# Patient Record
Sex: FEMALE | Race: BLACK OR AFRICAN AMERICAN | Employment: UNEMPLOYED | URBAN - METROPOLITAN AREA
[De-identification: names, ages, dates, MRNs, and addresses within clinical notes are randomized per-mention and may not be internally consistent; named-entity substitution may affect disease eponyms.]

---

## 2017-02-20 ENCOUNTER — OFFICE VISIT (OUTPATIENT)
Dept: ENDOCRINOLOGY | Age: 27
End: 2017-02-20

## 2017-02-20 VITALS
WEIGHT: 152.6 LBS | HEIGHT: 66 IN | BODY MASS INDEX: 24.53 KG/M2 | TEMPERATURE: 97.9 F | SYSTOLIC BLOOD PRESSURE: 131 MMHG | DIASTOLIC BLOOD PRESSURE: 67 MMHG | RESPIRATION RATE: 16 BRPM | HEART RATE: 83 BPM

## 2017-02-20 DIAGNOSIS — E06.9 THYROIDITIS: Primary | ICD-10-CM

## 2017-02-20 DIAGNOSIS — E06.1 THYROIDITIS, DE QUERVAIN: Primary | ICD-10-CM

## 2017-02-20 RX ORDER — VENLAFAXINE HYDROCHLORIDE 75 MG/1
CAPSULE, EXTENDED RELEASE ORAL
Refills: 3 | COMMUNITY
Start: 2017-01-31 | End: 2017-06-15 | Stop reason: SDUPTHER

## 2017-02-20 RX ORDER — ZOLPIDEM TARTRATE 10 MG/1
TABLET ORAL
Refills: 0 | COMMUNITY
Start: 2017-01-31 | End: 2020-05-29

## 2017-02-20 RX ORDER — FAMOTIDINE 20 MG/1
20 TABLET, FILM COATED ORAL
Qty: 30 TAB | Refills: 5 | Status: SHIPPED | OUTPATIENT
Start: 2017-02-20 | End: 2017-03-28 | Stop reason: ALTCHOICE

## 2017-02-20 RX ORDER — PREDNISONE 10 MG/1
30 TABLET ORAL
Qty: 21 TAB | Refills: 0 | Status: SHIPPED | OUTPATIENT
Start: 2017-02-20 | End: 2017-02-27

## 2017-02-20 RX ORDER — IBUPROFEN 400 MG/1
400 TABLET ORAL 3 TIMES DAILY
Qty: 30 TAB | Refills: 0 | Status: SHIPPED | OUTPATIENT
Start: 2017-02-20 | End: 2017-03-02

## 2017-02-20 RX ORDER — AMOXICILLIN 500 MG/1
CAPSULE ORAL
Refills: 0 | COMMUNITY
Start: 2017-02-15 | End: 2017-03-28 | Stop reason: ALTCHOICE

## 2017-02-20 NOTE — PROGRESS NOTES
[facility] ENDOCRINOLOGY               Anabel Gonzalez MD              [address] [address]           Patient Information  Date:2/21/2017  Name : Refugio Blackwood 32 y.o.     YOB: 1990         Referred by: Beronica Madrid NP         CC : Thyroid    History of present illness    Refugio Blackwood is a 32 y.o. female  here for evaluation of hyperthyroidism. she was found to have abnormal thyroid function tests,  TSH was 0.2     She is nine months post partum. A week ago she noticed  sore throat, had difficulty swallowing and felt like throat was closing. She went to the ER. Strep was negative . She also had sweating and occasional nervousness. No racing of the heart. She denies iodine exposure, viral infection. She is not lactating and her TSH was 0. 2.      weight loss. Bowels are regular. No history of known radiation exposure    No FH of thyroid disease. No FH of thyroid cancer     Past Medical History   Diagnosis Date    Anxiety     Depression     Migraines        Current Outpatient Prescriptions   Medication Sig    venlafaxine-SR (EFFEXOR-XR) 75 mg capsule TK ONE C PO  ONCE DAILY    zolpidem (AMBIEN) 10 mg tablet TK 1 T PO  QHS PRN    amoxicillin (AMOXIL) 500 mg capsule TK ONE C PO  TID    predniSONE (DELTASONE) 10 mg tablet Take 3 Tabs by mouth daily (with breakfast) for 7 days.  famotidine (PEPCID) 20 mg tablet Take 1 Tab by mouth every morning.  ibuprofen (MOTRIN) 400 mg tablet Take 1 Tab by mouth three (3) times daily for 10 days. No current facility-administered medications for this visit.           Review of Systems:  - Constitutional Symptoms: no fevers, chills,  - Eyes: no blurry vision or double vision  - Cardiovascular: no chest pain ,palpitations  - Respiratory: no cough + shortness of breath  - Gastrointestinal: no dysphagia or abdominal pain  - Musculoskeletal: no joint pains + weakness  - Integumentary: no rashes  - Neurological: no numbness, tingling, +headaches  - Psychiatric: no depression or anxiety  - Endocrine:+ heat intolerance, no polyuria or polydipsia    Physical Examination:  Blood pressure 131/67, pulse 83, temperature 97.9 °F (36.6 °C), temperature source Oral, resp. rate 16, height 5' 6\" (1.676 m), weight 152 lb 9.6 oz (69.2 kg), last menstrual period 02/18/2017. Body mass index is 24.63 kg/(m^2). - General: pleasant, no distress, good eye contact  - HEENT: no exopthalmos, no periorbital edema, no scleral/conjunctival injection, EOMI, no lid lag or stare  - Neck: supple, no thyromegaly, tender, no bruit   - Cardiovascular: regular,  normal S1 and S2, no murmurs  - Respiratory: clear to auscultation bilaterally  - Gastrointestinal: soft, nontender, nondistended, BS +  - Musculoskeletal: no proximal muscle weakness in upper or lower extremities  - Integumentary: no tremors, no edema  - Neurological: alert and oriented   - Psychiatric: normal mood and affect  - Skin - normal turgor    Data Reviewed:         [] Reviewed labs    Assessment/Plan:     1. Thyroiditis, de Quervain      Patient has tenderness and has some symptoms of hyperthyroidism. She is nine months post partum, post partum thyroiditis is painless and audrey  has DeQuervain's thyroiditis. Check TSH along with free T4, total T3 and TPO. Given severe symptoms,start Prednisone, No fever, no sick contacts. If no improvement US thyroid       Follow-up Disposition:  Return in about 5 weeks (around 3/27/2017). Thank you for allowing me to participate in the care of this patient.     Ami Jorge MD      Patient verbalized understanding

## 2017-02-20 NOTE — MR AVS SNAPSHOT
Visit Information Date & Time Provider Department Dept. Phone Encounter #  
 2/20/2017  2:00 PM Mayte Way MD [facility] [phone] [id number] Follow-up Instructions Return in about 5 weeks (around 3/27/2017). Upcoming Health Maintenance Date Due  
 HPV AGE 9Y-34Y (1 of 3 - Female 3 Dose Series) 8/30/2001 DTaP/Tdap/Td series (1 - Tdap) 8/30/2011 PAP AKA CERVICAL CYTOLOGY 8/30/2011 INFLUENZA AGE 9 TO ADULT 8/1/2016 Allergies as of 2/20/2017  Review Complete On: 2/20/2017 By: Mayte Way MD  
  
 Severity Noted Reaction Type Reactions Naproxen  02/20/2017    Itching Current Immunizations  Never Reviewed No immunizations on file. Not reviewed this visit You Were Diagnosed With   
  
 Codes Comments Thyroiditis    -  Primary ICD-10-CM: E06.9 ICD-9-CM: 496. 9 Vitals BP Pulse Temp Resp Height(growth percentile) Weight(growth percentile) 131/67 (BP 1 Location: Right arm, BP Patient Position: Sitting) 83 97.9 °F (36.6 °C) (Oral) 16 5' 6\" (1.676 m) 152 lb 9.6 oz (69.2 kg) LMP BMI OB Status Smoking Status 02/18/2017 24.63 kg/m2 Having regular periods Current Every Day Smoker BMI and BSA Data Body Mass Index Body Surface Area  
 24.63 kg/m 2 1.8 m 2 Preferred Pharmacy Pharmacy Name Phone [facility], [address] [place] [address] AT [address] [place] [address] Road [phone] Your Updated Medication List  
  
   
This list is accurate as of: 2/20/17  2:44 PM.  Always use your most recent med list.  
  
  
  
  
 amoxicillin 500 mg capsule Commonly known as:  AMOXIL TK ONE C PO  TID  
  
 venlafaxine-SR 75 mg capsule Commonly known as:  EFFEXOR-XR TK ONE C PO  ONCE DAILY  
  
 zolpidem 10 mg tablet Commonly known as:  AMBIEN  
TK 1 T PO  QHS PRN Follow-up Instructions Return in about 5 weeks (around 3/27/2017). Introducing [facility] & HEALTH SERVICES! Shame[facility] Peter introduces Write.my patient portal. Now you can access parts of your medical record, email your doctor's office, and request medication refills online. 1. In your internet browser, go to [email or web address] 2. Click on the First Time User? Click Here link in the Sign In box. You will see the New Member Sign Up page. 3. Enter your Write.my Access Code exactly as it appears below. You will not need to use this code after youve completed the sign-up process. If you do not sign up before the expiration date, you must request a new code. · Write.my Access Code: [email or web address] Expires: 5/21/2017  2:44 PM 
 
4. Enter the last four digits of your Social Security Number (xxxx) and Date of Birth (mm/dd/yyyy) as indicated and click Submit. You will be taken to the next sign-up page. 5. Create a Write.my ID. This will be your Write.my login ID and cannot be changed, so think of one that is secure and easy to remember. 6. Create a Write.my password. You can change your password at any time. 7. Enter your Password Reset Question and Answer. This can be used at a later time if you forget your password. 8. Enter your e-mail address. You will receive e-mail notification when new information is available in [address] 9. Click Sign Up. You can now view and download portions of your medical record. 10. Click the Download Summary menu link to download a portable copy of your medical information. If you have questions, please visit the Frequently Asked Questions section of the Write.my website. Remember, Write.my is NOT to be used for urgent needs. For medical emergencies, dial 911. Now available from your iPhone and Android! Please provide this summary of care documentation to your next provider. Your primary care clinician is listed as Radha ENRIQUE. If you have any questions after today's visit, please call [phone].

## 2017-02-21 LAB
T3 SERPL-MCNC: 138 NG/DL (ref 71–180)
T4 FREE SERPL-MCNC: 1.44 NG/DL (ref 0.82–1.77)
THYROGLOB AB SERPL-ACNC: <1 IU/ML (ref 0–0.9)
THYROPEROXIDASE AB SERPL-ACNC: 14 IU/ML (ref 0–34)
TSH SERPL DL<=0.005 MIU/L-ACNC: 0.9 UIU/ML (ref 0.45–4.5)

## 2017-03-28 ENCOUNTER — OFFICE VISIT (OUTPATIENT)
Dept: ENDOCRINOLOGY | Age: 27
End: 2017-03-28

## 2017-03-28 VITALS — DIASTOLIC BLOOD PRESSURE: 86 MMHG | HEART RATE: 78 BPM | SYSTOLIC BLOOD PRESSURE: 126 MMHG

## 2017-03-28 DIAGNOSIS — E06.1 THYROIDITIS, DE QUERVAIN: Primary | ICD-10-CM

## 2017-03-28 DIAGNOSIS — R05.3 CHRONIC COUGH: ICD-10-CM

## 2017-03-28 DIAGNOSIS — E05.90 SUBCLINICAL HYPERTHYROIDISM: ICD-10-CM

## 2017-03-28 NOTE — PROGRESS NOTES
Manfred Reed is a 32 y.o. female here for   Chief Complaint   Patient presents with    Thyroid Problem       Wt Readings from Last 3 Encounters:   02/20/17 152 lb 9.6 oz (69.2 kg)     Temp Readings from Last 3 Encounters:   02/20/17 97.9 °F (36.6 °C) (Oral)     BP Readings from Last 3 Encounters:   02/20/17 131/67     Pulse Readings from Last 3 Encounters:   02/20/17 83

## 2017-03-28 NOTE — PROGRESS NOTES
[facility] ENDOCRINOLOGY               David Arshad MD              [address] [address]           Patient Information  Date:3/28/2017  Name : Ginny Groves 32 y.o.     YOB: 1990         Referred by: Barbara Pollard NP         CC : Thyroid    History of present illness    Ginny Groves is a 32 y.o. female  here for fu  of hyperthyroidism. she was found to have abnormal thyroid function tests,  TSH was 0.2   Feels better now  Has cough which she is not able to get rid off  Post nasal drip     She denies iodine exposure, viral infection. She is not lactating and her TSH was 0.2. No weight loss        No history of known radiation exposure    No FH of thyroid disease. No FH of thyroid cancer     Past Medical History:   Diagnosis Date    Anxiety     Depression     Migraines        Current Outpatient Prescriptions   Medication Sig    venlafaxine-SR (EFFEXOR-XR) 75 mg capsule TK ONE C PO  ONCE DAILY    zolpidem (AMBIEN) 10 mg tablet TK 1 T PO  QHS PRN     No current facility-administered medications for this visit. Review of Systems:  -   - Gastrointestinal: no dysphagia or abdominal pain  - Musculoskeletal: no joint pains + weakness  - Integumentary: no rashes  - Neurological: no numbness, tingling, +headaches  - Psychiatric: no depression or anxiety  - Endocrine:no heat intolerance, no polyuria or polydipsia    Physical Examination:  Blood pressure 126/86, pulse 78, temperature (P) 98.6 °F (37 °C), temperature source (P) Oral, resp. rate (P) 16, height (P) 5' 6\" (1.676 m), weight (P) 151 lb 8 oz (68.7 kg). Body mass index is 24.45 kg/(m^2) (pended).   - General: pleasant, no distress, good eye contact  - HEENT: no exopthalmos, no periorbital edema, no scleral/conjunctival injection, EOMI, no lid lag or stare  - Neck: supple, no thyromegaly,non  tender, no bruit   - Cardiovascular: regular,  normal S1 and S2, no murmurs  - Respiratory: LLL rub   - Gastrointestinal: soft, nontender, nondistended, BS +  - Musculoskeletal: no proximal muscle weakness in upper or lower extremities  - Integumentary: no tremors, no edema  - Neurological: alert and oriented   - Psychiatric: normal mood and affect  - Skin - normal turgor    Data Reviewed:         [] Reviewed labs    Assessment/Plan:     1. Thyroiditis, de Quervain    2. Chronic cough    3. Subclinical hyperthyroidism      She is 10 months post partum, post partum thyroiditis is painless and she  likley  had DeQuervain's thyroiditis. Improved TSH   Lab Results   Component Value Date/Time    TSH 0.428 03/20/2017 11:45 AM    should improve and asked to get TSH checked in 6 months or return here for labs       Chronic cough - has post nasal drip , antibiotics helps but she cough recurs  Has an appointment with PCP soon     Follow-up Disposition:  Return in about 6 months (around 9/28/2017). Thank you for allowing me to participate in the care of this patient.     Brisa Nascimento MD      Patient verbalized understanding

## 2017-03-28 NOTE — MR AVS SNAPSHOT
Visit Information Date & Time Provider Department Dept. Phone Encounter #  
 3/28/2017  3:45 PM Lindsey Johnson MD [facility] [phone] [id number] Follow-up Instructions Return in about 6 months (around 9/28/2017). Upcoming Health Maintenance Date Due  
 HPV AGE 9Y-34Y (1 of 3 - Female 3 Dose Series) 8/30/2001 Pneumococcal 19-64 Medium Risk (1 of 1 - PPSV23) 8/30/2009 DTaP/Tdap/Td series (1 - Tdap) 8/30/2011 PAP AKA CERVICAL CYTOLOGY 8/30/2011 INFLUENZA AGE 9 TO ADULT 8/1/2016 Allergies as of 3/28/2017  Review Complete On: 3/28/2017 By: Lindsey Johnson MD  
  
 Severity Noted Reaction Type Reactions Naproxen  02/20/2017    Itching Current Immunizations  Never Reviewed No immunizations on file. Not reviewed this visit You Were Diagnosed With   
  
 Codes Comments Thyroiditis, de Quervain    -  Primary ICD-10-CM: E06.1 ICD-9-CM: 245.1 Vitals BP Pulse Temp Resp Height(growth percentile) Weight(growth percentile) 126/86 (BP 1 Location: Right arm, BP Patient Position: Sitting) 78 (P) 98.6 °F (37 °C) (Oral) (P) 16 (P) 5' 6\" (1.676 m) (P) 151 lb 8 oz (68.7 kg) BMI OB Status Smoking Status (P) 24.45 kg/m2 Having regular periods Current Every Day Smoker Vitals History BMI and BSA Data Body Mass Index Body Surface Area (P) 24.45 kg/m 2 (P) 1.79 m 2 Preferred Pharmacy Pharmacy Name Phone [facility] [address], [address] [place] [address] AT [address] [place] [address] Road [phone] Your Updated Medication List  
  
   
This list is accurate as of: 3/28/17  4:12 PM.  Always use your most recent med list.  
  
  
  
  
 venlafaxine-SR 75 mg capsule Commonly known as:  EFFEXOR-XR TK ONE C PO  ONCE DAILY  
  
 zolpidem 10 mg tablet Commonly known as:  AMBIEN  
TK 1 T PO  QHS PRN Follow-up Instructions Return in about 6 months (around 9/28/2017). Introducing [facility] & HEALTH SERVICES! [facility] introduces [employer] patient portal. Now you can access parts of your medical record, email your doctor's office, and request medication refills online. 1. In your internet browser, go to [email or web address] 2. Click on the First Time User? Click Here link in the Sign In box. You will see the New Member Sign Up page. 3. Enter your [employer] Access Code exactly as it appears below. You will not need to use this code after youve completed the sign-up process. If you do not sign up before the expiration date, you must request a new code. · [employer] Access Code: [email or web address] Expires: 5/21/2017  3:44 PM 
 
4. Enter the last four digits of your Social Security Number (xxxx) and Date of Birth (mm/dd/yyyy) as indicated and click Submit. You will be taken to the next sign-up page. 5. Create a [employer] ID. This will be your [employer] login ID and cannot be changed, so think of one that is secure and easy to remember. 6. Create a [employer] password. You can change your password at any time. 7. Enter your Password Reset Question and Answer. This can be used at a later time if you forget your password. 8. Enter your e-mail address. You will receive e-mail notification when new information is available in [address] 9. Click Sign Up. You can now view and download portions of your medical record. 10. Click the Download Summary menu link to download a portable copy of your medical information. If you have questions, please visit the Frequently Asked Questions section of the [employer] website. Remember, [employer] is NOT to be used for urgent needs. For medical emergencies, dial 911. Now available from your iPhone and Android! Please provide this summary of care documentation to your next provider. Your primary care clinician is listed as Renetta ENRIQUE.  If you have any questions after today's visit, please call [phone].

## 2017-06-15 ENCOUNTER — OFFICE VISIT (OUTPATIENT)
Dept: ENDOCRINOLOGY | Age: 27
End: 2017-06-15

## 2017-06-15 VITALS
HEIGHT: 66 IN | SYSTOLIC BLOOD PRESSURE: 116 MMHG | OXYGEN SATURATION: 100 % | HEART RATE: 75 BPM | RESPIRATION RATE: 16 BRPM | TEMPERATURE: 97.2 F | BODY MASS INDEX: 23.96 KG/M2 | DIASTOLIC BLOOD PRESSURE: 63 MMHG | WEIGHT: 149.1 LBS

## 2017-06-15 DIAGNOSIS — E05.90 HYPERTHYROIDISM: ICD-10-CM

## 2017-06-15 DIAGNOSIS — E53.8 VITAMIN B12 DEFICIENCY: ICD-10-CM

## 2017-06-15 DIAGNOSIS — M79.10 MYALGIA: ICD-10-CM

## 2017-06-15 DIAGNOSIS — E05.90 SUBCLINICAL HYPERTHYROIDISM: Primary | ICD-10-CM

## 2017-06-15 DIAGNOSIS — E55.9 VITAMIN D DEFICIENCY: Primary | ICD-10-CM

## 2017-06-15 DIAGNOSIS — R53.82 CHRONIC FATIGUE: ICD-10-CM

## 2017-06-15 DIAGNOSIS — E55.9 VITAMIN D DEFICIENCY: ICD-10-CM

## 2017-06-15 RX ORDER — BUTALBITAL, ACETAMINOPHEN, CAFFEINE AND CODEINE PHOSPHATE 300; 50; 40; 30 MG/1; MG/1; MG/1; MG/1
CAPSULE ORAL
COMMUNITY
End: 2020-05-29

## 2017-06-15 RX ORDER — METHIMAZOLE 5 MG/1
2.5 TABLET ORAL DAILY
Qty: 15 TAB | Refills: 5 | Status: SHIPPED | OUTPATIENT
Start: 2017-06-15 | End: 2020-05-29

## 2017-06-15 RX ORDER — ACETAMINOPHEN 500 MG
2000 TABLET ORAL DAILY
Qty: 30 CAP | Refills: 5 | Status: SHIPPED | OUTPATIENT
Start: 2017-06-15 | End: 2020-05-29

## 2017-06-15 RX ORDER — MAGNESIUM 200 MG
2000 TABLET ORAL DAILY
Qty: 60 TAB | Refills: 5 | Status: SHIPPED | OUTPATIENT
Start: 2017-06-15 | End: 2020-05-29

## 2017-06-15 RX ORDER — VENLAFAXINE HYDROCHLORIDE 150 MG/1
CAPSULE, EXTENDED RELEASE ORAL
Refills: 0 | COMMUNITY
Start: 2017-06-01 | End: 2020-05-29

## 2017-06-15 NOTE — PROGRESS NOTES
Sincere Joseph is a 32 y.o. female here for   Chief Complaint   Patient presents with    Thyroid Problem       1. Have you been to the ER, urgent care clinic since your last visit? Hospitalized since your last visit? - Julio Butler last week Thyroid    2. Have you seen or consulted any other health care providers outside of the [address] [place] [address] since your last visit?   Include any pap smears or colon screening.- Dr. Joyce Fan, PCP    [facility] Readings from Last 3 Encounters:   03/28/17 (P) 151 lb 8 oz (68.7 kg)   02/20/17 152 lb 9.6 oz (69.2 kg)     Temp Readings from Last 3 Encounters:   03/28/17 (P) 98.6 °F (37 °C) ((P) Oral)   02/20/17 97.9 °F (36.6 °C) (Oral)     BP Readings from Last 3 Encounters:   03/28/17 126/86   02/20/17 131/67     Pulse Readings from Last 3 Encounters:   03/28/17 78   02/20/17 83

## 2017-06-15 NOTE — PROGRESS NOTES
Maribell Gama MD              [address] [address]           Patient Information  Date:6/17/2017  Name : Tanya Burciaga 32 y.o.     YOB: 1990         Referred by: Ritu Andrea NP         CC : Thyroid    History of present illness    Tanya Burciaga is a 32 y.o. female  here for fu  of hyperthyroidism. she was found to have abnormal thyroid function tests,  TSH was 0.2       LMP month ago   Last pregnancy 2016   Heat intolerance, losing weight     +  cough  GERD      She denies iodine exposure, viral infection. No history of known radiation exposure    No FH of thyroid disease. No FH of thyroid cancer     Past Medical History:   Diagnosis Date    Anxiety     Depression     Migraines        Current Outpatient Prescriptions   Medication Sig    venlafaxine-SR (EFFEXOR-XR) 150 mg capsule TK ONE C PO  C PO DAILY    butalbital-acetaminop-caf-cod -13-30 mg cap Take  by mouth.  zolpidem (AMBIEN) 10 mg tablet TK 1 T PO  QHS PRN    methIMAzole (TAPAZOLE) 5 mg tablet Take 0.5 Tabs by mouth daily.  Cholecalciferol, Vitamin D3, (VITAMIN D3) 2,000 unit cap capsule Take 2,000 Units by mouth daily.  cyanocobalamin (VITAMIN B-12) 1,000 mcg sublingual tablet Take 2 Tabs by mouth daily. No current facility-administered medications for this visit. Review of Systems:  -   - Gastrointestinal: no dysphagia or abdominal pain  - Musculoskeletal: no joint pains + weakness  - Integumentary: no rashes  - Neurological: no numbness, tingling, +headaches  - Psychiatric: no depression or anxiety  - Endocrine:no heat intolerance, no polyuria or polydipsia    Physical Examination:  Blood pressure 116/63, pulse 75, temperature 97.2 °F (36.2 °C), temperature source Oral, resp. rate 16, height 5' 6\" (1.676 m), weight 149 lb 1.6 oz (67.6 kg), SpO2 100 %.     Body mass index is 24.07 kg/(m^2). - General: pleasant, no distress, good eye contact  - HEENT: no exopthalmos, no periorbital edema, no scleral/conjunctival injection, EOMI, no lid lag or stare  - Neck: supple, no thyromegaly,non  tender, no bruit   - Cardiovascular: regular,  normal S1 and S2, no murmurs  - Respiratory: LLL rub   - Gastrointestinal: soft, nontender, nondistended, BS +  - Musculoskeletal: no proximal muscle weakness in upper or lower extremities  - Integumentary: no tremors, no edema  - Neurological: alert and oriented   - Psychiatric: normal mood and affect  - Skin - normal turgor    Data Reviewed:         [] Reviewed labs    Assessment/Plan:     1. Subclinical hyperthyroidism    2. Myalgia    3. Chronic fatigue    4. Vitamin D deficiency      She has subclinical hyperthyroidism and usually treatment is not indicated     Given unexplained symptoms - trial of MMI   PPI  Lab Results   Component Value Date/Time    TSH 0.428 03/20/2017 11:45 AM     Anemia - iron supplements and Vit D     Follow-up Disposition:  Return in about 6 weeks (around 7/27/2017). Thank you for allowing me to participate in the care of this patient.     Martha Fuentes MD      Patient verbalized understanding

## 2017-07-30 ENCOUNTER — ED HISTORICAL/CONVERTED ENCOUNTER (OUTPATIENT)
Dept: OTHER | Age: 27
End: 2017-07-30

## 2018-02-02 ENCOUNTER — OP HISTORICAL/CONVERTED ENCOUNTER (OUTPATIENT)
Dept: OTHER | Age: 28
End: 2018-02-02

## 2018-02-05 ENCOUNTER — OP HISTORICAL/CONVERTED ENCOUNTER (OUTPATIENT)
Dept: OTHER | Age: 28
End: 2018-02-05

## 2019-03-28 ENCOUNTER — OP HISTORICAL/CONVERTED ENCOUNTER (OUTPATIENT)
Dept: OTHER | Age: 29
End: 2019-03-28

## 2019-06-20 ENCOUNTER — OP HISTORICAL/CONVERTED ENCOUNTER (OUTPATIENT)
Dept: OTHER | Age: 29
End: 2019-06-20

## 2019-07-02 ENCOUNTER — OP HISTORICAL/CONVERTED ENCOUNTER (OUTPATIENT)
Dept: OTHER | Age: 29
End: 2019-07-02

## 2019-08-01 ENCOUNTER — OP HISTORICAL/CONVERTED ENCOUNTER (OUTPATIENT)
Dept: OTHER | Age: 29
End: 2019-08-01

## 2020-05-29 ENCOUNTER — VIRTUAL VISIT (OUTPATIENT)
Dept: ENDOCRINOLOGY | Age: 30
End: 2020-05-29

## 2020-05-29 DIAGNOSIS — R53.83 FATIGUE, UNSPECIFIED TYPE: ICD-10-CM

## 2020-05-29 DIAGNOSIS — N64.3 GALACTORRHEA: ICD-10-CM

## 2020-05-29 DIAGNOSIS — R63.5 WEIGHT GAIN: ICD-10-CM

## 2020-05-29 DIAGNOSIS — R61 HYPERHIDROSIS: ICD-10-CM

## 2020-05-29 DIAGNOSIS — E04.9 GOITER: ICD-10-CM

## 2020-05-29 DIAGNOSIS — E04.9 GOITER: Primary | ICD-10-CM

## 2020-05-29 DIAGNOSIS — R13.12 DYSPHAGIA, OROPHARYNGEAL: ICD-10-CM

## 2020-05-29 RX ORDER — CEFUROXIME AXETIL 250 MG/1
6 TABLET ORAL
COMMUNITY

## 2020-05-29 RX ORDER — ESCITALOPRAM OXALATE 20 MG/1
20 TABLET ORAL DAILY
COMMUNITY
End: 2022-10-27

## 2020-05-29 NOTE — PROGRESS NOTES
Beny Pérez MD        Patient Information  Date:5/29/2020  Name : Anthony Carver 34 y.o.     YOB: 1990         Referred by: Unique Manuel NP     Pursuant to the emergency declaration under the [facility], [facility] waiver authority and the Coronavirus Preparedness and [employer] Act, this Virtual  Visit was conducted, with patient's consent, to reduce the patient's risk of exposure to COVID-19 . Patient  is aware that this is a billable encounter and is responsible for copays/deductibles       Services were provided through a video synchronous discussion virtually to substitute for in-person clinic visit. Place of service: Provider : Office  Patient: Home    CC : Thyroid    History of present illness    Anthony Carver is a 34 y.o. female    She was seen in 2017 for subclinical hyperthyroidism  Noted more sore throat, cough, has allergies and postnasal drainage, continues to have GERD  She also has underlying asthma  She has been gaining weight, hysterectomy in 2018 for heavy menstrual cycles. Reports excessive sweating  This started after hysterectomy, at the same time venlafaxine was changed to a different antidepressant  She did not have oophorectomy  Has nipple discharge  She is on antipsychotics  Reports dysphagia      No history of known radiation exposure     FH of thyroid disease. No FH of thyroid cancer     Past Medical History:   Diagnosis Date    Anxiety     Depression     Migraines        Current Outpatient Medications   Medication Sig    escitalopram oxalate (Lexapro) 20 mg tablet Take 20 mg by mouth daily.  SUMAtriptan succinate 6 mg/0.5 mL kit 6 mg by SubCUTAneous route once as needed for Migraine.  cariprazine (Vraylar) 3 mg capsule Take  by mouth daily. No current facility-administered medications for this visit.           Review of Systems:  -   - Gastrointestinal: + Dysphagia or abdominal pain  - Musculoskeletal: no joint pains + weakness  - Integumentary: no rashes  - Neurological: no numbness, tingling, +headaches  - Psychiatric: no depression or anxiety  - Endocrine:no heat intolerance, no polyuria or polydipsia    Physical Examination:  There were no vitals taken for this visit. There is no height or weight on file to calculate BMI. General: pleasant, no distress, good eye contact  HEENT: no exophthalmos, no periorbital edema, EOMI  Neck:  visible thyromegaly  RS: Normal respiratory effort  Musculoskeletal: no tremors  Neurological: alert and oriented  Psychiatric: normal mood and affect  Skin: Normal color  Data Reviewed:         [] Reviewed labs    Assessment/Plan:     1. Goiter    2. Galactorrhea    3. Fatigue, unspecified type    4. Weight gain    5. Hyperhidrosis    6. Dysphagia, oropharyngeal      Dysphagia likely due to GERD, throat inflammation from allergies, postnasal drainage  Goiter is not clinically impressive  Obtain thyroid function tests  Ultrasound versus barium swallow  She smokes but trying to quit, using Chantix    Galactorrhea: On antipsychotics which can increase prolactin  If it is mild it is usually medication related      Weight gain/fatigue: Discussed about lifestyle changes  On antipsychotics contributes    Hyperhidrosis: Check FSH, estradiol  Venlafaxine was discontinued and was started on Lexapro which could have also contributed  Venlafaxine helps with hyperhidrosis        Thank you for allowing me to participate in the care of this patient.     Flori Peña MD      Patient verbalized understanding

## 2020-05-29 NOTE — PROGRESS NOTES
Perry Starks is a 34 y.o. female here for   Chief Complaint   Patient presents with    Thyroid Problem     Pt consented to virtual visit. 1. Have you been to the ER, urgent care clinic since your last visit? Hospitalized since your last visit? -no    2. Have you seen or consulted any other health care providers outside of the [address] [place] [address] since your last visit? Include any pap smears or colon screening. -PCP

## 2020-06-05 ENCOUNTER — HOSPITAL ENCOUNTER (OUTPATIENT)
Dept: GENERAL RADIOLOGY | Age: 30
Discharge: HOME | End: 2020-06-05
Attending: INTERNAL MEDICINE
Payer: MEDICAID

## 2020-06-05 ENCOUNTER — HOSPITAL ENCOUNTER (OUTPATIENT)
Dept: ULTRASOUND IMAGING | Age: 30
Discharge: HOME | End: 2020-06-05
Attending: INTERNAL MEDICINE
Payer: MEDICAID

## 2020-06-05 DIAGNOSIS — E04.9 GOITER: ICD-10-CM

## 2020-06-05 DIAGNOSIS — R13.12 DYSPHAGIA, OROPHARYNGEAL: ICD-10-CM

## 2020-06-05 PROCEDURE — 76536 US EXAM OF HEAD AND NECK: CPT

## 2020-06-05 PROCEDURE — 74220 X-RAY XM ESOPHAGUS 1CNTRST: CPT

## 2020-06-05 NOTE — PROGRESS NOTES
Discussed with pt   OTC Prilosec x 4 - 6 weeks , if no improvement ENT eval   Mother has Eagle Syndrome

## 2020-08-03 ENCOUNTER — OP HISTORICAL/CONVERTED ENCOUNTER (OUTPATIENT)
Dept: OTHER | Age: 30
End: 2020-08-03

## 2020-08-10 ENCOUNTER — OP HISTORICAL/CONVERTED ENCOUNTER (OUTPATIENT)
Dept: OTHER | Age: 30
End: 2020-08-10

## 2020-10-05 RX ORDER — ARIPIPRAZOLE 5 MG/1
5 TABLET ORAL DAILY
Status: ON HOLD | COMMUNITY
End: 2020-10-12

## 2020-10-08 ENCOUNTER — HOSPITAL ENCOUNTER (OUTPATIENT)
Dept: PREADMISSION TESTING | Age: 30
Discharge: HOME | End: 2020-10-08
Payer: MEDICAID

## 2020-10-08 LAB — SARS-COV-2, COV2: NORMAL

## 2020-10-08 PROCEDURE — 87635 SARS-COV-2 COVID-19 AMP PRB: CPT

## 2020-10-11 LAB — SARS-COV-2, COV2NT: NOT DETECTED

## 2020-10-12 ENCOUNTER — HOSPITAL ENCOUNTER (OUTPATIENT)
Age: 30
Discharge: HOME | End: 2020-10-12
Attending: ORTHOPAEDIC SURGERY | Admitting: ORTHOPAEDIC SURGERY
Payer: MEDICAID

## 2020-10-12 ENCOUNTER — ANESTHESIA (OUTPATIENT)
Dept: SURGERY | Age: 30
End: 2020-10-12
Payer: MEDICAID

## 2020-10-12 ENCOUNTER — ANESTHESIA EVENT (OUTPATIENT)
Dept: SURGERY | Age: 30
End: 2020-10-12
Payer: MEDICAID

## 2020-10-12 ENCOUNTER — HOSPITAL ENCOUNTER (OUTPATIENT)
Dept: GENERAL RADIOLOGY | Age: 30
Discharge: HOME | End: 2020-10-12
Attending: ORTHOPAEDIC SURGERY
Payer: MEDICAID

## 2020-10-12 VITALS
HEART RATE: 70 BPM | SYSTOLIC BLOOD PRESSURE: 110 MMHG | DIASTOLIC BLOOD PRESSURE: 79 MMHG | BODY MASS INDEX: 25.71 KG/M2 | TEMPERATURE: 97.6 F | RESPIRATION RATE: 16 BRPM | OXYGEN SATURATION: 100 % | HEIGHT: 66 IN | WEIGHT: 160 LBS

## 2020-10-12 DIAGNOSIS — T84.84XA PAINFUL ORTHOPAEDIC HARDWARE (HCC): Primary | ICD-10-CM

## 2020-10-12 PROCEDURE — 76210000006 HC OR PH I REC 0.5 TO 1 HR: Performed by: ORTHOPAEDIC SURGERY

## 2020-10-12 PROCEDURE — 74011000250 HC RX REV CODE- 250: Performed by: NURSE ANESTHETIST, CERTIFIED REGISTERED

## 2020-10-12 PROCEDURE — 76000 FLUOROSCOPY <1 HR PHYS/QHP: CPT

## 2020-10-12 PROCEDURE — 74011000258 HC RX REV CODE- 258: Performed by: NURSE ANESTHETIST, CERTIFIED REGISTERED

## 2020-10-12 PROCEDURE — 77030041703 HC SLV COMPR DVT ARJO -B: Performed by: ORTHOPAEDIC SURGERY

## 2020-10-12 PROCEDURE — 76060000033 HC ANESTHESIA 1 TO 1.5 HR: Performed by: ORTHOPAEDIC SURGERY

## 2020-10-12 PROCEDURE — 74011250636 HC RX REV CODE- 250/636: Performed by: ORTHOPAEDIC SURGERY

## 2020-10-12 PROCEDURE — 77030002916 HC SUT ETHLN J&J -A: Performed by: ORTHOPAEDIC SURGERY

## 2020-10-12 PROCEDURE — 76210000026 HC REC RM PH II 1 TO 1.5 HR: Performed by: ORTHOPAEDIC SURGERY

## 2020-10-12 PROCEDURE — 2709999900 HC NON-CHARGEABLE SUPPLY: Performed by: ORTHOPAEDIC SURGERY

## 2020-10-12 PROCEDURE — 77030031139 HC SUT VCRL2 J&J -A: Performed by: ORTHOPAEDIC SURGERY

## 2020-10-12 PROCEDURE — 76010000149 HC OR TIME 1 TO 1.5 HR: Performed by: ORTHOPAEDIC SURGERY

## 2020-10-12 PROCEDURE — 77030012935 HC DRSG AQUACEL BMS -B: Performed by: ORTHOPAEDIC SURGERY

## 2020-10-12 PROCEDURE — 77030010507 HC ADH SKN DERMBND J&J -B: Performed by: ORTHOPAEDIC SURGERY

## 2020-10-12 PROCEDURE — 74011250636 HC RX REV CODE- 250/636: Performed by: NURSE ANESTHETIST, CERTIFIED REGISTERED

## 2020-10-12 PROCEDURE — 74011000250 HC RX REV CODE- 250: Performed by: ORTHOPAEDIC SURGERY

## 2020-10-12 PROCEDURE — 77030002933 HC SUT MCRYL J&J -A: Performed by: ORTHOPAEDIC SURGERY

## 2020-10-12 RX ORDER — MIDAZOLAM HYDROCHLORIDE 1 MG/ML
INJECTION, SOLUTION INTRAMUSCULAR; INTRAVENOUS
Status: COMPLETED
Start: 2020-10-12 | End: 2020-10-12

## 2020-10-12 RX ORDER — FENTANYL CITRATE 50 UG/ML
INJECTION, SOLUTION INTRAMUSCULAR; INTRAVENOUS AS NEEDED
Status: DISCONTINUED | OUTPATIENT
Start: 2020-10-12 | End: 2020-10-12 | Stop reason: HOSPADM

## 2020-10-12 RX ORDER — PROPOFOL 10 MG/ML
INJECTION, EMULSION INTRAVENOUS AS NEEDED
Status: DISCONTINUED | OUTPATIENT
Start: 2020-10-12 | End: 2020-10-12 | Stop reason: HOSPADM

## 2020-10-12 RX ORDER — ONDANSETRON 2 MG/ML
INJECTION INTRAMUSCULAR; INTRAVENOUS AS NEEDED
Status: DISCONTINUED | OUTPATIENT
Start: 2020-10-12 | End: 2020-10-12 | Stop reason: HOSPADM

## 2020-10-12 RX ORDER — HYDROMORPHONE HYDROCHLORIDE 1 MG/ML
0.5 INJECTION, SOLUTION INTRAMUSCULAR; INTRAVENOUS; SUBCUTANEOUS
Status: DISCONTINUED | OUTPATIENT
Start: 2020-10-12 | End: 2020-10-12 | Stop reason: HOSPADM

## 2020-10-12 RX ORDER — SODIUM CHLORIDE, SODIUM LACTATE, POTASSIUM CHLORIDE, CALCIUM CHLORIDE 600; 310; 30; 20 MG/100ML; MG/100ML; MG/100ML; MG/100ML
1000 INJECTION, SOLUTION INTRAVENOUS CONTINUOUS
Status: DISCONTINUED | OUTPATIENT
Start: 2020-10-12 | End: 2020-10-12 | Stop reason: HOSPADM

## 2020-10-12 RX ORDER — METOPROLOL TARTRATE 5 MG/5ML
2.5 INJECTION INTRAVENOUS
Status: DISCONTINUED | OUTPATIENT
Start: 2020-10-12 | End: 2020-10-12 | Stop reason: HOSPADM

## 2020-10-12 RX ORDER — ONDANSETRON 2 MG/ML
4 INJECTION INTRAMUSCULAR; INTRAVENOUS AS NEEDED
Status: DISCONTINUED | OUTPATIENT
Start: 2020-10-12 | End: 2020-10-12 | Stop reason: HOSPADM

## 2020-10-12 RX ORDER — SODIUM CHLORIDE, SODIUM LACTATE, POTASSIUM CHLORIDE, CALCIUM CHLORIDE 600; 310; 30; 20 MG/100ML; MG/100ML; MG/100ML; MG/100ML
INJECTION, SOLUTION INTRAVENOUS
Status: DISCONTINUED | OUTPATIENT
Start: 2020-10-12 | End: 2020-10-12 | Stop reason: HOSPADM

## 2020-10-12 RX ORDER — DEXAMETHASONE SODIUM PHOSPHATE 4 MG/ML
INJECTION, SOLUTION INTRA-ARTICULAR; INTRALESIONAL; INTRAMUSCULAR; INTRAVENOUS; SOFT TISSUE
Status: DISCONTINUED
Start: 2020-10-12 | End: 2020-10-12 | Stop reason: HOSPADM

## 2020-10-12 RX ORDER — SODIUM CHLORIDE 0.9 % (FLUSH) 0.9 %
5-40 SYRINGE (ML) INJECTION AS NEEDED
Status: DISCONTINUED | OUTPATIENT
Start: 2020-10-12 | End: 2020-10-12 | Stop reason: HOSPADM

## 2020-10-12 RX ORDER — SODIUM CHLORIDE 0.9 % (FLUSH) 0.9 %
5-40 SYRINGE (ML) INJECTION EVERY 8 HOURS
Status: DISCONTINUED | OUTPATIENT
Start: 2020-10-12 | End: 2020-10-12 | Stop reason: HOSPADM

## 2020-10-12 RX ORDER — DEXAMETHASONE SODIUM PHOSPHATE 4 MG/ML
INJECTION, SOLUTION INTRA-ARTICULAR; INTRALESIONAL; INTRAMUSCULAR; INTRAVENOUS; SOFT TISSUE AS NEEDED
Status: DISCONTINUED | OUTPATIENT
Start: 2020-10-12 | End: 2020-10-12 | Stop reason: HOSPADM

## 2020-10-12 RX ORDER — BUPIVACAINE HYDROCHLORIDE 5 MG/ML
INJECTION, SOLUTION EPIDURAL; INTRACAUDAL AS NEEDED
Status: DISCONTINUED | OUTPATIENT
Start: 2020-10-12 | End: 2020-10-12 | Stop reason: HOSPADM

## 2020-10-12 RX ORDER — FENTANYL CITRATE 50 UG/ML
50 INJECTION, SOLUTION INTRAMUSCULAR; INTRAVENOUS
Status: DISCONTINUED | OUTPATIENT
Start: 2020-10-12 | End: 2020-10-12 | Stop reason: HOSPADM

## 2020-10-12 RX ORDER — CEFAZOLIN SODIUM IN 0.9 % NACL 2 G/100 ML
2 PLASTIC BAG, INJECTION (ML) INTRAVENOUS ONCE
Status: COMPLETED | OUTPATIENT
Start: 2020-10-12 | End: 2020-10-12

## 2020-10-12 RX ORDER — MIDAZOLAM HYDROCHLORIDE 1 MG/ML
INJECTION, SOLUTION INTRAMUSCULAR; INTRAVENOUS AS NEEDED
Status: DISCONTINUED | OUTPATIENT
Start: 2020-10-12 | End: 2020-10-12 | Stop reason: HOSPADM

## 2020-10-12 RX ORDER — ARIPIPRAZOLE 10 MG/1
10 TABLET ORAL DAILY
COMMUNITY
End: 2022-10-27

## 2020-10-12 RX ORDER — ROPIVACAINE HYDROCHLORIDE 5 MG/ML
INJECTION, SOLUTION EPIDURAL; INFILTRATION; PERINEURAL
Status: DISCONTINUED
Start: 2020-10-12 | End: 2020-10-12 | Stop reason: HOSPADM

## 2020-10-12 RX ORDER — OXYCODONE HYDROCHLORIDE 5 MG/1
5 TABLET ORAL
Qty: 28 TAB | Refills: 0 | Status: SHIPPED | OUTPATIENT
Start: 2020-10-12 | End: 2020-10-19

## 2020-10-12 RX ORDER — KETOROLAC TROMETHAMINE 30 MG/ML
INJECTION, SOLUTION INTRAMUSCULAR; INTRAVENOUS AS NEEDED
Status: DISCONTINUED | OUTPATIENT
Start: 2020-10-12 | End: 2020-10-12 | Stop reason: HOSPADM

## 2020-10-12 RX ORDER — SODIUM CHLORIDE 9 MG/ML
1000 INJECTION, SOLUTION INTRAVENOUS CONTINUOUS
Status: DISCONTINUED | OUTPATIENT
Start: 2020-10-12 | End: 2020-10-12 | Stop reason: HOSPADM

## 2020-10-12 RX ORDER — LIDOCAINE HYDROCHLORIDE 20 MG/ML
INJECTION, SOLUTION EPIDURAL; INFILTRATION; INTRACAUDAL; PERINEURAL AS NEEDED
Status: DISCONTINUED | OUTPATIENT
Start: 2020-10-12 | End: 2020-10-12 | Stop reason: HOSPADM

## 2020-10-12 RX ORDER — FENTANYL CITRATE 50 UG/ML
INJECTION, SOLUTION INTRAMUSCULAR; INTRAVENOUS
Status: COMPLETED
Start: 2020-10-12 | End: 2020-10-12

## 2020-10-12 RX ORDER — LABETALOL HCL 20 MG/4 ML
10 SYRINGE (ML) INTRAVENOUS
Status: DISCONTINUED | OUTPATIENT
Start: 2020-10-12 | End: 2020-10-12 | Stop reason: HOSPADM

## 2020-10-12 RX ADMIN — ONDANSETRON 4 MG: 2 INJECTION INTRAMUSCULAR; INTRAVENOUS at 11:18

## 2020-10-12 RX ADMIN — DEXMEDETOMIDINE HYDROCHLORIDE 10 MCG: 100 INJECTION, SOLUTION, CONCENTRATE INTRAVENOUS at 11:30

## 2020-10-12 RX ADMIN — CEFAZOLIN 2 G: 10 INJECTION, POWDER, FOR SOLUTION INTRAVENOUS; PARENTERAL at 11:11

## 2020-10-12 RX ADMIN — SODIUM CHLORIDE, POTASSIUM CHLORIDE, SODIUM LACTATE AND CALCIUM CHLORIDE: 600; 310; 30; 20 INJECTION, SOLUTION INTRAVENOUS at 10:50

## 2020-10-12 RX ADMIN — FENTANYL CITRATE 50 MCG: 50 INJECTION, SOLUTION INTRAMUSCULAR; INTRAVENOUS at 11:13

## 2020-10-12 RX ADMIN — DEXAMETHASONE SODIUM PHOSPHATE 4 MG: 4 INJECTION, SOLUTION INTRA-ARTICULAR; INTRALESIONAL; INTRAMUSCULAR; INTRAVENOUS; SOFT TISSUE at 11:18

## 2020-10-12 RX ADMIN — DEXMEDETOMIDINE HYDROCHLORIDE 10 MCG: 100 INJECTION, SOLUTION, CONCENTRATE INTRAVENOUS at 11:28

## 2020-10-12 RX ADMIN — DEXMEDETOMIDINE HYDROCHLORIDE 10 MCG: 100 INJECTION, SOLUTION, CONCENTRATE INTRAVENOUS at 11:26

## 2020-10-12 RX ADMIN — PROPOFOL 200 MG: 10 INJECTION, EMULSION INTRAVENOUS at 11:12

## 2020-10-12 RX ADMIN — FENTANYL CITRATE 50 MCG: 50 INJECTION, SOLUTION INTRAMUSCULAR; INTRAVENOUS at 11:28

## 2020-10-12 RX ADMIN — LIDOCAINE HYDROCHLORIDE 60 MG: 20 INJECTION, SOLUTION EPIDURAL; INFILTRATION; INTRACAUDAL; PERINEURAL at 11:12

## 2020-10-12 RX ADMIN — DEXMEDETOMIDINE HYDROCHLORIDE 10 MCG: 100 INJECTION, SOLUTION, CONCENTRATE INTRAVENOUS at 11:12

## 2020-10-12 RX ADMIN — SODIUM CHLORIDE, POTASSIUM CHLORIDE, SODIUM LACTATE AND CALCIUM CHLORIDE 1000 ML: 600; 310; 30; 20 INJECTION, SOLUTION INTRAVENOUS at 08:33

## 2020-10-12 RX ADMIN — MIDAZOLAM HYDROCHLORIDE 2 MG: 2 INJECTION, SOLUTION INTRAMUSCULAR; INTRAVENOUS at 11:06

## 2020-10-12 RX ADMIN — DEXMEDETOMIDINE HYDROCHLORIDE 10 MCG: 100 INJECTION, SOLUTION, CONCENTRATE INTRAVENOUS at 11:38

## 2020-10-12 RX ADMIN — KETOROLAC TROMETHAMINE 30 MG: 30 INJECTION, SOLUTION INTRAMUSCULAR at 11:25

## 2020-10-12 NOTE — OP NOTES
Name of patient: Meri Ferrer     MRN: [id number]    Date of surgery: 10/12/2020     Surgeon: Adela Jim MD    Assistant: OR scrub nurse    Pre-operative diagnosis: Painful retained hardware, right distal femur    Post operative diagnosis: Painful retained hardware, right distal femur    Procedure: Hardware removal from right distal femur    Anesthesia: General with LMA    Complications: none    Blood loss: 20 cc    Specimen: None    IMPLANTS:   Explanted Arthrex distal femur plate with 7 screws    Indication of the procedure: 27y.o. -year-old female patient was undergone distal femoral osteotomy 1 and half years ago by Dr. Ceci Calloway. Patient was recovering very well except for having pain, palpable hardware and crepitus around the knee from prominent plate. Risk and benefits of hardware removal were discussed and then patient was scheduled for surgery. Procedure details: Patient as well as right lower extremity was identified and marked in the holding area. Patient was then brought to the operating room and positioned supine onto the operating table. General anesthesia with LMA was induced by anesthesia provider. 2 g of Ancef was started go 30 minutes prior to incision. Now standard prepping and draping was done. time-out was completed and agreed by everyone in the room. Tourniquet was inflated to 225 mm hg.     previous surgical incision was excised and then dissection was carried through the soft tissue up to the lateral aspect of the femur. The plate was palpated. Proximal portion of the plate was exposed with retraction of vastus lateralis anteriorly. Now all 7 screws were removed without much difficulty. Plate was removed without much difficulty. Now plenty of irrigation was done. Fluoroscopic views were done to confirm healing of osteotomy site and removal of all implants. Now tourniquet was deflated and bleeders were coagulated. Plenty of irrigation was performed.   IT band was closed with 0 Vicryl. subcutaneous closure was done using 2-0 Vicryl and skin was opposed with 3-0 Monocryl. Steri-Strips and Aquacel dressing was applied. all sharp and sponge counts were correct. patient was then transferred to recovery room in stable condition. I was scrubbed in and performed the entire procedure by myself.     Hemant Ambrocio MD

## 2020-10-12 NOTE — ANESTHESIA PREPROCEDURE EVALUATION
Relevant Problems   No relevant active problems       Anesthetic History   No history of anesthetic complications            Review of Systems / Medical History  Patient summary reviewed, nursing notes reviewed and pertinent labs reviewed    Pulmonary  Within defined limits                 Neuro/Psych         Headaches and psychiatric history    Comments: Bipolar Depression  Anxiety Cardiovascular  Within defined limits                Exercise tolerance: >4 METS     GI/Hepatic/Renal  Within defined limits              Endo/Other      Hyperthyroidism      Comments: Myalgia  Chronic Fatigue Other Findings              Physical Exam    Airway  Mallampati: I  TM Distance: 4 - 6 cm  Neck ROM: normal range of motion   Mouth opening: Normal     Cardiovascular    Rhythm: regular  Rate: normal         Dental  No notable dental hx       Pulmonary  Breath sounds clear to auscultation               Abdominal  GI exam deferred       Other Findings            Anesthetic Plan    ASA: 2  Anesthesia type: general          Induction: Intravenous  Anesthetic plan and risks discussed with: Patient

## 2020-10-12 NOTE — PERIOP NOTES
Patient alert and oriented x 4 with respirations even and unlabored on RA. Patient discharged home per physician's order. Patient verbalizes understanding of discharge instructions. Pair of crutches given to patient per physician's order. Patient transported via wheelchair to front hospital entrance with family present to transport patient via private vehicle.

## 2020-10-12 NOTE — DISCHARGE INSTRUCTIONS
Orthopedic discharge instructions  No need to change dressing unless soaked  Ice  No weightbearing on the right lower extremity till follow-up  Knee range of motion exercises  Tylenol, ibuprofen and oxycodone for the pain  Take aspirin 81 mg once a day for 4 weeks for blood clot prevention  Follow-up with Dr. Michelle Ambrose in 2 weeks

## 2020-10-12 NOTE — ANESTHESIA POSTPROCEDURE EVALUATION
Procedure(s):  Removal of Hardware Right Distal Femur Plate.     general    Anesthesia Post Evaluation      Multimodal analgesia: multimodal analgesia used between 6 hours prior to anesthesia start to PACU discharge  Patient location during evaluation: PACU  Patient participation: complete - patient participated  Level of consciousness: awake  Pain score: 0  Pain management: adequate  Airway patency: patent  Anesthetic complications: no  Cardiovascular status: acceptable  Respiratory status: acceptable  Hydration status: acceptable  Post anesthesia nausea and vomiting:  controlled  Final Post Anesthesia Temperature Assessment:  Normothermia (36.0-37.5 degrees C)      INITIAL Post-op Vital signs:   Vitals Value Taken Time   /82 10/12/2020 12:30 PM   Temp 36.4 °C (97.5 °F) 10/12/2020 12:19 PM   Pulse 80 10/12/2020 12:30 PM   Resp 19 10/12/2020 12:30 PM   SpO2 100 % 10/12/2020 12:30 PM

## 2022-10-27 PROBLEM — N39.41 URGE URINARY INCONTINENCE: Status: ACTIVE | Noted: 2022-10-27

## 2022-10-27 PROBLEM — N39.46 MIXED INCONTINENCE URGE AND STRESS: Status: ACTIVE | Noted: 2022-10-27

## (undated) DEVICE — SUT ETHLN 4-0 18IN FS2 BLK --

## (undated) DEVICE — ROCKER SWITCH PENCIL BLADE ELECTRODE, HOLSTER: Brand: EDGE

## (undated) DEVICE — COVER LT HNDL BLU PLAS

## (undated) DEVICE — TURNOVER KIT A GEN SURG

## (undated) DEVICE — DRAPE EQUIP C ARM 74X42 IN MOB XR W/ TIE RUBBER BND LF

## (undated) DEVICE — INTENDED FOR TISSUE SEPARATION, AND OTHER PROCEDURES THAT REQUIRE A SHARP SURGICAL BLADE TO PUNCTURE OR CUT.: Brand: BARD-PARKER SAFETY BLADES SIZE 15, STERILE

## (undated) DEVICE — GAUZE,SPONGE,4"X4",16PLY,STRL,LF,10/TRAY: Brand: MEDLINE

## (undated) DEVICE — DRSG AQUACEL SURG 3.5 X 10IN -- CONVERT TO ITEM 370183

## (undated) DEVICE — SUTURE ABSORBABLE BRAIDED 2-0 CP2 27 IN UD VICRYL + VCP869H

## (undated) DEVICE — SUT MCRYL + 3-0 27IN PS1 UD --

## (undated) DEVICE — BANDAGE, ELASTIC, POLYESTER/SPANDEX, SELF CLOSURE, NON-STERILE, LATEX-FREE, WHITE, 4"X5YD: Brand: TRONEX INTERNATIONAL, INC.

## (undated) DEVICE — DERMABOND SKIN ADH 0.7ML -- DERMABOND ADVANCED 12/BX

## (undated) DEVICE — T-DRAPE,EXTREMITY,STERILE: Brand: MEDLINE

## (undated) DEVICE — PREP SKN DURAPREP 26ML APPL --

## (undated) DEVICE — STERILE POLYISOPRENE POWDER-FREE SURGICAL GLOVES WITH EMOLLIENT COATING: Brand: PROTEXIS

## (undated) DEVICE — TURNOVER KIT OR CUST CMB FLD GEN LF

## (undated) DEVICE — MINOR EXTREMITY PACK: Brand: MEDLINE INDUSTRIES, INC.

## (undated) DEVICE — GOWN,PREVENTION PLUS,XLN/XL,ST,24/CS: Brand: MEDLINE

## (undated) DEVICE — BASIC SINGLE BASIN-LF: Brand: MEDLINE INDUSTRIES, INC.

## (undated) DEVICE — 3M™ STERI-STRIP™ REINFORCED ADHESIVE SKIN CLOSURES, R1542, 1/4 IN X 1-1/2 IN (6 MM X 38 MM), 6 STRIPS/ENVELOPE: Brand: 3M™ STERI-STRIP™

## (undated) DEVICE — STERILE POLYISOPRENE POWDER-FREE SURGICAL GLOVES: Brand: PROTEXIS

## (undated) DEVICE — BANDAGE,GAUZE,BULKEE II,4.5"X4.1YD,STRL: Brand: MEDLINE

## (undated) DEVICE — PAD,PREPPING,CUFFED,24X48,7",NONSTERILE: Brand: MEDLINE

## (undated) DEVICE — GARMENT CMPR STD UNV CALF FLWT -- RN VENTILATE NS DISP 17- IN

## (undated) DEVICE — 3M™ STERI-STRIP™ REINFORCED ADHESIVE SKIN CLOSURES, R1547, 1/2 IN X 4 IN (12 MM X 100 MM), 6 STRIPS/ENVELOPE: Brand: 3M™ STERI-STRIP™

## (undated) DEVICE — TUBING SUCTION UNIV 3/16 INX20 FT W/ SCALLOPED CONN STRL

## (undated) DEVICE — REM POLYHESIVE ADULT PATIENT RETURN ELECTRODE: Brand: VALLEYLAB

## (undated) DEVICE — 3M™ COBAN™ NL STERILE NON-LATEX SELF-ADHERENT WRAP, 2084S, 4 IN X 5 YD (10 CM X 4,5 M), 18 ROLLS/CASE: Brand: 3M™ COBAN™

## (undated) DEVICE — SOL IRR NACL 0.9% 500ML POUR --